# Patient Record
Sex: FEMALE | Race: WHITE | NOT HISPANIC OR LATINO
[De-identification: names, ages, dates, MRNs, and addresses within clinical notes are randomized per-mention and may not be internally consistent; named-entity substitution may affect disease eponyms.]

---

## 2017-05-17 ENCOUNTER — CLINICAL ADVICE (OUTPATIENT)
Age: 42
End: 2017-05-17

## 2017-05-17 DIAGNOSIS — N60.19 DIFFUSE CYSTIC MASTOPATHY OF UNSPECIFIED BREAST: ICD-10-CM

## 2017-06-27 ENCOUNTER — RX RENEWAL (OUTPATIENT)
Age: 42
End: 2017-06-27

## 2017-07-25 ENCOUNTER — APPOINTMENT (OUTPATIENT)
Dept: OBGYN | Facility: CLINIC | Age: 42
End: 2017-07-25

## 2017-07-25 VITALS
HEIGHT: 64 IN | BODY MASS INDEX: 24.75 KG/M2 | SYSTOLIC BLOOD PRESSURE: 120 MMHG | DIASTOLIC BLOOD PRESSURE: 82 MMHG | WEIGHT: 145 LBS

## 2017-07-25 RX ORDER — DESOGESTREL AND ETHINYL ESTRADIOL 7 DAYS X 3
0.1/0.125/0.15 KIT ORAL DAILY
Qty: 28 | Refills: 5 | Status: ACTIVE | COMMUNITY
Start: 2017-07-25 | End: 1900-01-01

## 2017-08-09 ENCOUNTER — APPOINTMENT (OUTPATIENT)
Dept: OBGYN | Facility: CLINIC | Age: 42
End: 2017-08-09
Payer: COMMERCIAL

## 2017-08-09 ENCOUNTER — ASOB RESULT (OUTPATIENT)
Age: 42
End: 2017-08-09

## 2017-08-09 PROCEDURE — 76830 TRANSVAGINAL US NON-OB: CPT

## 2017-08-18 RX ORDER — DESOGESTREL AND ETHINYL ESTRADIOL 7 DAYS X 3
0.1/0.125/0.15 KIT ORAL DAILY
Qty: 28 | Refills: 1 | Status: ACTIVE | COMMUNITY
Start: 2017-06-21 | End: 1900-01-01

## 2017-10-16 ENCOUNTER — RX RENEWAL (OUTPATIENT)
Age: 42
End: 2017-10-16

## 2018-01-13 ENCOUNTER — MOBILE ON CALL (OUTPATIENT)
Age: 43
End: 2018-01-13

## 2018-04-03 ENCOUNTER — RX RENEWAL (OUTPATIENT)
Age: 43
End: 2018-04-03

## 2018-04-03 RX ORDER — DESOGESTREL AND ETHINYL ESTRADIOL 7 DAYS X 3
0.1/0.125/0.15 KIT ORAL DAILY
Qty: 28 | Refills: 0 | Status: ACTIVE | COMMUNITY
Start: 2018-01-13 | End: 1900-01-01

## 2018-05-22 ENCOUNTER — RX RENEWAL (OUTPATIENT)
Age: 43
End: 2018-05-22

## 2018-09-26 ENCOUNTER — ASOB RESULT (OUTPATIENT)
Age: 43
End: 2018-09-26

## 2018-09-26 ENCOUNTER — APPOINTMENT (OUTPATIENT)
Dept: OBGYN | Facility: CLINIC | Age: 43
End: 2018-09-26
Payer: COMMERCIAL

## 2018-09-26 VITALS
WEIGHT: 148 LBS | HEIGHT: 64 IN | BODY MASS INDEX: 25.27 KG/M2 | SYSTOLIC BLOOD PRESSURE: 120 MMHG | DIASTOLIC BLOOD PRESSURE: 80 MMHG

## 2018-09-26 PROCEDURE — 99396 PREV VISIT EST AGE 40-64: CPT

## 2018-09-26 PROCEDURE — 76830 TRANSVAGINAL US NON-OB: CPT

## 2018-09-26 RX ORDER — DESOGESTREL AND ETHINYL ESTRADIOL 7 DAYS X 3
0.1/0.125/0.15 KIT ORAL
Qty: 28 | Refills: 5 | Status: ACTIVE | COMMUNITY
Start: 2018-09-26 | End: 1900-01-01

## 2018-09-27 LAB
CANDIDA VAG CYTO: NOT DETECTED
G VAGINALIS+PREV SP MTYP VAG QL MICRO: NOT DETECTED
HPV HIGH+LOW RISK DNA PNL CVX: NOT DETECTED
T VAGINALIS VAG QL WET PREP: NOT DETECTED

## 2018-09-28 ENCOUNTER — MESSAGE (OUTPATIENT)
Age: 43
End: 2018-09-28

## 2018-09-28 LAB
C TRACH RRNA SPEC QL NAA+PROBE: NOT DETECTED
N GONORRHOEA RRNA SPEC QL NAA+PROBE: NOT DETECTED
SOURCE AMPLIFICATION: NORMAL

## 2018-10-08 ENCOUNTER — CLINICAL ADVICE (OUTPATIENT)
Age: 43
End: 2018-10-08

## 2018-10-08 ENCOUNTER — MESSAGE (OUTPATIENT)
Age: 43
End: 2018-10-08

## 2018-10-08 LAB — CYTOLOGY CVX/VAG DOC THIN PREP: NORMAL

## 2018-10-24 ENCOUNTER — APPOINTMENT (OUTPATIENT)
Dept: OBGYN | Facility: CLINIC | Age: 43
End: 2018-10-24

## 2019-10-09 ENCOUNTER — RX RENEWAL (OUTPATIENT)
Age: 44
End: 2019-10-09

## 2019-10-09 RX ORDER — DESOGESTREL AND ETHINYL ESTRADIOL 7 DAYS X 3
0.1/0.125/0.15 KIT ORAL DAILY
Qty: 1 | Refills: 0 | Status: ACTIVE | COMMUNITY
Start: 2018-05-16 | End: 1900-01-01

## 2019-10-29 ENCOUNTER — APPOINTMENT (OUTPATIENT)
Dept: OBGYN | Facility: CLINIC | Age: 44
End: 2019-10-29
Payer: COMMERCIAL

## 2019-10-29 VITALS
HEIGHT: 64 IN | DIASTOLIC BLOOD PRESSURE: 78 MMHG | WEIGHT: 148 LBS | SYSTOLIC BLOOD PRESSURE: 116 MMHG | BODY MASS INDEX: 25.27 KG/M2

## 2019-10-29 PROCEDURE — 99396 PREV VISIT EST AGE 40-64: CPT

## 2019-10-29 RX ORDER — DESOGESTREL AND ETHINYL ESTRADIOL 7 DAYS X 3
0.1/0.125/0.15 KIT ORAL
Qty: 28 | Refills: 5 | Status: ACTIVE | COMMUNITY
Start: 2019-10-29 | End: 1900-01-01

## 2019-10-31 ENCOUNTER — MESSAGE (OUTPATIENT)
Age: 44
End: 2019-10-31

## 2019-10-31 LAB — HPV HIGH+LOW RISK DNA PNL CVX: NOT DETECTED

## 2019-11-05 ENCOUNTER — MESSAGE (OUTPATIENT)
Age: 44
End: 2019-11-05

## 2019-11-05 LAB — CYTOLOGY CVX/VAG DOC THIN PREP: ABNORMAL

## 2019-11-18 ENCOUNTER — ASOB RESULT (OUTPATIENT)
Age: 44
End: 2019-11-18

## 2019-11-18 ENCOUNTER — APPOINTMENT (OUTPATIENT)
Dept: OBGYN | Facility: CLINIC | Age: 44
End: 2019-11-18
Payer: COMMERCIAL

## 2019-11-18 PROCEDURE — 76830 TRANSVAGINAL US NON-OB: CPT

## 2019-11-19 ENCOUNTER — MESSAGE (OUTPATIENT)
Age: 44
End: 2019-11-19

## 2019-11-19 ENCOUNTER — CLINICAL ADVICE (OUTPATIENT)
Age: 44
End: 2019-11-19

## 2020-09-21 ENCOUNTER — APPOINTMENT (OUTPATIENT)
Dept: OBGYN | Facility: CLINIC | Age: 45
End: 2020-09-21
Payer: COMMERCIAL

## 2020-09-21 VITALS
BODY MASS INDEX: 24.75 KG/M2 | HEIGHT: 64 IN | SYSTOLIC BLOOD PRESSURE: 108 MMHG | DIASTOLIC BLOOD PRESSURE: 71 MMHG | WEIGHT: 145 LBS

## 2020-09-21 PROCEDURE — 99213 OFFICE O/P EST LOW 20 MIN: CPT

## 2020-09-21 RX ORDER — DESOGESTREL AND ETHINYL ESTRADIOL 7 DAYS X 3
0.1/0.125/0.15 KIT ORAL
Qty: 28 | Refills: 5 | Status: ACTIVE | COMMUNITY
Start: 2020-09-21 | End: 1900-01-01

## 2020-09-21 NOTE — HISTORY OF PRESENT ILLNESS
[FreeTextEntry1] : 44yo, G0 presents for evaluation and management of dysmenorrhea\par Her gyn histroy is significant for:\par 1.Dysmenorrhea\par 2.ASCUS\par LMP 9/15/2020

## 2020-09-21 NOTE — DISCUSSION/SUMMARY
[FreeTextEntry1] : 46yo with menses today.\par Dysmenorrhea well controlled on OCP, risks reviewed.\par Plan:\par 1.RTO PAP\par 2.OCP 6 months

## 2020-09-21 NOTE — PHYSICAL EXAM
[Examination Of The Breasts] : a normal appearance [No Masses] : no breast masses were palpable [Labia Majora] : normal [Labia Minora] : normal [Moderate] : There was moderate vaginal bleeding [Normal] : normal [Uterine Adnexae] : normal

## 2020-11-03 ENCOUNTER — APPOINTMENT (OUTPATIENT)
Dept: OBGYN | Facility: CLINIC | Age: 45
End: 2020-11-03
Payer: COMMERCIAL

## 2020-11-03 VITALS
WEIGHT: 145 LBS | DIASTOLIC BLOOD PRESSURE: 69 MMHG | HEIGHT: 64 IN | SYSTOLIC BLOOD PRESSURE: 127 MMHG | BODY MASS INDEX: 24.75 KG/M2

## 2020-11-03 PROCEDURE — 99072 ADDL SUPL MATRL&STAF TM PHE: CPT

## 2020-11-03 PROCEDURE — 99396 PREV VISIT EST AGE 40-64: CPT

## 2020-11-03 RX ORDER — FLUTICASONE PROPIONATE 0.05 MG/G
0.01 OINTMENT TOPICAL TWICE DAILY
Qty: 1 | Refills: 1 | Status: ACTIVE | COMMUNITY
Start: 2020-11-03 | End: 1900-01-01

## 2020-11-03 NOTE — HISTORY OF PRESENT ILLNESS
[FreeTextEntry1] : 46yo, G0 with gyn history significant for:\par 1.Dysmenorrhea\par 2.ASCUS\par LMP 10/26/2020

## 2020-11-03 NOTE — DISCUSSION/SUMMARY
[FreeTextEntry1] : 44yo with good cycle control on OCP, risks reviewed.\par She has been under a lot of stress lately, and "....can't relax..."\par She has used xanax in the past, risks reviewed.\par Additionally she has an allergic type deramtitis, rt axilla\par Plan:\par 1.PAP\par 2.Xanax 0.25#30\par 3.Steroind creme

## 2020-11-05 ENCOUNTER — MESSAGE (OUTPATIENT)
Age: 45
End: 2020-11-05

## 2020-11-05 LAB — HPV HIGH+LOW RISK DNA PNL CVX: NOT DETECTED

## 2020-11-08 LAB — CYTOLOGY CVX/VAG DOC THIN PREP: NORMAL

## 2020-12-18 ENCOUNTER — NON-APPOINTMENT (OUTPATIENT)
Age: 45
End: 2020-12-18

## 2020-12-22 ENCOUNTER — NON-APPOINTMENT (OUTPATIENT)
Age: 45
End: 2020-12-22

## 2020-12-23 ENCOUNTER — ASOB RESULT (OUTPATIENT)
Age: 45
End: 2020-12-23

## 2020-12-23 ENCOUNTER — APPOINTMENT (OUTPATIENT)
Dept: OBGYN | Facility: CLINIC | Age: 45
End: 2020-12-23
Payer: COMMERCIAL

## 2020-12-23 PROCEDURE — 99072 ADDL SUPL MATRL&STAF TM PHE: CPT

## 2020-12-23 PROCEDURE — 76830 TRANSVAGINAL US NON-OB: CPT

## 2020-12-24 ENCOUNTER — MESSAGE (OUTPATIENT)
Age: 45
End: 2020-12-24

## 2020-12-28 ENCOUNTER — NON-APPOINTMENT (OUTPATIENT)
Age: 45
End: 2020-12-28

## 2021-03-19 ENCOUNTER — NON-APPOINTMENT (OUTPATIENT)
Age: 46
End: 2021-03-19

## 2021-05-04 ENCOUNTER — APPOINTMENT (OUTPATIENT)
Dept: OBGYN | Facility: CLINIC | Age: 46
End: 2021-05-04
Payer: COMMERCIAL

## 2021-05-04 VITALS
WEIGHT: 145 LBS | HEIGHT: 64 IN | SYSTOLIC BLOOD PRESSURE: 144 MMHG | DIASTOLIC BLOOD PRESSURE: 78 MMHG | BODY MASS INDEX: 24.75 KG/M2

## 2021-05-04 DIAGNOSIS — N94.6 DYSMENORRHEA, UNSPECIFIED: ICD-10-CM

## 2021-05-04 PROCEDURE — 99214 OFFICE O/P EST MOD 30 MIN: CPT

## 2021-05-04 PROCEDURE — 99072 ADDL SUPL MATRL&STAF TM PHE: CPT

## 2021-05-04 RX ORDER — DESOGESTREL AND ETHINYL ESTRADIOL 7 DAYS X 3
0.1/0.125/0.15 KIT ORAL
Qty: 28 | Refills: 5 | Status: ACTIVE | COMMUNITY
Start: 2021-05-04 | End: 1900-01-01

## 2021-05-04 RX ORDER — ALPRAZOLAM 0.25 MG/1
0.25 TABLET ORAL
Qty: 30 | Refills: 0 | Status: ACTIVE | COMMUNITY
Start: 2021-05-04 | End: 1900-01-01

## 2021-05-04 NOTE — HISTORY OF PRESENT ILLNESS
[FreeTextEntry1] : 46yo, G0 presents for evaluation and management of dysmenorrhea which has been  well controlled with OCP.\par Her gyn history is also significant for:\par 1.ASCUS\par LMP 4/28/21

## 2021-05-04 NOTE — DISCUSSION/SUMMARY
[FreeTextEntry1] : 44yo with reaction to Pfizer vaccine post both doses:vomitting and headache-normal brain CT\par Good cycle control on OCP, risks reviewed\par Anxiety issues discussed-good relief with episodic Xanax, risks reviewed\par Plan:\par 1.OCP 6 months\par 2.Mammo 3/2021\par 3.RTO 6 months

## 2021-11-16 ENCOUNTER — APPOINTMENT (OUTPATIENT)
Dept: OBGYN | Facility: CLINIC | Age: 46
End: 2021-11-16
Payer: COMMERCIAL

## 2021-11-16 VITALS
SYSTOLIC BLOOD PRESSURE: 122 MMHG | HEIGHT: 64 IN | BODY MASS INDEX: 24.92 KG/M2 | WEIGHT: 146 LBS | DIASTOLIC BLOOD PRESSURE: 75 MMHG

## 2021-11-16 PROCEDURE — 99396 PREV VISIT EST AGE 40-64: CPT

## 2021-11-16 RX ORDER — DESOGESTREL AND ETHINYL ESTRADIOL 7 DAYS X 3
0.1/0.125/0.15 KIT ORAL
Qty: 28 | Refills: 5 | Status: ACTIVE | COMMUNITY
Start: 2021-11-16 | End: 1900-01-01

## 2021-11-16 RX ORDER — ALPRAZOLAM 0.25 MG/1
0.25 TABLET ORAL
Qty: 30 | Refills: 0 | Status: ACTIVE | COMMUNITY
Start: 2021-11-16 | End: 1900-01-01

## 2021-11-16 NOTE — HISTORY OF PRESENT ILLNESS
[FreeTextEntry1] : 47yo, G0 with gyn history significant fr:\par 1.Dysmenorrhea\par 2.ASCUS\par LMP 11/10/21

## 2021-11-16 NOTE — DISCUSSION/SUMMARY
[FreeTextEntry1] : 45yo with episodes of anxiety related to stressful job.She has been helped by alparazolam , risks reviewed.\par Risks OCP reviewed\par \par Plan:\par 1.PAP\par 2.Mammo\par 3.Continue OCP

## 2021-11-17 ENCOUNTER — MESSAGE (OUTPATIENT)
Age: 46
End: 2021-11-17

## 2021-11-17 LAB — HPV HIGH+LOW RISK DNA PNL CVX: NOT DETECTED

## 2021-11-21 ENCOUNTER — NON-APPOINTMENT (OUTPATIENT)
Age: 46
End: 2021-11-21

## 2021-11-22 ENCOUNTER — MESSAGE (OUTPATIENT)
Age: 46
End: 2021-11-22

## 2021-11-22 LAB — CYTOLOGY CVX/VAG DOC THIN PREP: NORMAL

## 2022-05-17 ENCOUNTER — APPOINTMENT (OUTPATIENT)
Dept: OBGYN | Facility: CLINIC | Age: 47
End: 2022-05-17
Payer: COMMERCIAL

## 2022-05-17 ENCOUNTER — TRANSCRIPTION ENCOUNTER (OUTPATIENT)
Age: 47
End: 2022-05-17

## 2022-05-17 VITALS
BODY MASS INDEX: 24.92 KG/M2 | HEART RATE: 86 BPM | DIASTOLIC BLOOD PRESSURE: 82 MMHG | WEIGHT: 146 LBS | SYSTOLIC BLOOD PRESSURE: 129 MMHG | HEIGHT: 64 IN

## 2022-05-17 DIAGNOSIS — F41.9 ANXIETY DISORDER, UNSPECIFIED: ICD-10-CM

## 2022-05-17 DIAGNOSIS — Z30.9 ENCOUNTER FOR CONTRACEPTIVE MANAGEMENT, UNSPECIFIED: ICD-10-CM

## 2022-05-17 DIAGNOSIS — Z12.39 ENCOUNTER FOR OTHER SCREENING FOR MALIGNANT NEOPLASM OF BREAST: ICD-10-CM

## 2022-05-17 PROCEDURE — 99213 OFFICE O/P EST LOW 20 MIN: CPT

## 2022-05-17 RX ORDER — ALPRAZOLAM 0.25 MG/1
0.25 TABLET ORAL
Qty: 30 | Refills: 0 | Status: ACTIVE | COMMUNITY
Start: 2020-11-03 | End: 1900-01-01

## 2022-05-18 NOTE — HISTORY OF PRESENT ILLNESS
[FreeTextEntry1] : 47yo female LMP 3 wk ago  presents for birth control counseling\par Patient reports taking Cylessa (Ethinyl Est./Desogesterol) for about 20 years. Recenty she has had severe HA since January. Went to Neuro and had MRA that was wnl. made major life changes but has not gone away. Associated with hormones. Stopped 2-3 weeks ago and feels that the headaches are getting better. \par Patient would like an alternative BC\par \par Patient also needs a more up to date breast mammo and sono prescription\par \par Patient also asks for 30 Xanax pills for 6 months which Dr. Vidal prescribed to help with racing thoughts once in a while.\par \par ROS: Denies fever/chills, Cough/sore throat, CP, SOB, N/V, Diarrhea/Constipation, Pelvic pain, Urinary frequency/ urgency/ Incontinence, irregular discharge or vaginal bleeding\par \par \par

## 2022-05-18 NOTE — PHYSICAL EXAM
[Chaperone Present] : A chaperone was present in the examining room during all aspects of the physical examination [Appropriately responsive] : appropriately responsive [Alert] : alert [No Acute Distress] : no acute distress [Soft] : soft [Non-tender] : non-tender [Oriented x3] : oriented x3

## 2022-05-18 NOTE — PLAN
[FreeTextEntry1] : BC management: Discussed all Progesterone options and surgical options. Patient prefers NOrethindrone\par Administration and use discussed\par \par Anxiety: encouraged patient to seek out a therapist. Patient declined mental health referral team. Explained to patient that the prescription can be sent out of courtesy but that I recommend lifestyle and habit changes \par \par GYN counseling: Patient instructed to call for Unusual Pelvic pain,  UTI symptoms, irregular vaginal bleeding/discharge- Patient verbalized agreement\par \par F/U: patient to follow up in 3 months to check on the Norethindrone \par \par

## 2022-05-23 ENCOUNTER — NON-APPOINTMENT (OUTPATIENT)
Age: 47
End: 2022-05-23

## 2022-05-23 RX ORDER — NORETHINDRONE 0.35 MG/1
0.35 TABLET ORAL
Qty: 3 | Refills: 0 | Status: ACTIVE | COMMUNITY
Start: 2022-05-20 | End: 1900-01-01

## 2022-07-12 ENCOUNTER — APPOINTMENT (OUTPATIENT)
Dept: OBGYN | Facility: CLINIC | Age: 47
End: 2022-07-12

## 2022-07-12 VITALS
BODY MASS INDEX: 24.92 KG/M2 | WEIGHT: 146 LBS | SYSTOLIC BLOOD PRESSURE: 105 MMHG | DIASTOLIC BLOOD PRESSURE: 68 MMHG | HEIGHT: 64 IN

## 2022-07-12 PROCEDURE — 99212 OFFICE O/P EST SF 10 MIN: CPT

## 2022-07-12 RX ORDER — NYSTATIN AND TRIAMCINOLONE ACETONIDE 100000; 1 MG/G; MG/G
100000-0.1 CREAM TOPICAL TWICE DAILY
Qty: 1 | Refills: 0 | Status: ACTIVE | COMMUNITY
Start: 2022-07-12 | End: 1900-01-01

## 2022-07-12 NOTE — DISCUSSION/SUMMARY
[FreeTextEntry1] : BV panel and urine culture ordered.\par rx sent for mycolog\par will await culture for possible treatment\par GYN counseling: Patient instructed to call for Unusual Pelvic pain, UTI symptoms, irregular vaginal bleeding/discharge- Patient verbalized agreement\par

## 2022-07-12 NOTE — PHYSICAL EXAM
[Chaperone Present] : A chaperone was present in the examining room during all aspects of the physical examination [FreeTextEntry1] : Ángela CASIANO [Normal] : uterus [No Bleeding] : there was no active vaginal bleeding [Uterine Adnexae] : were not tender and not enlarged

## 2022-07-12 NOTE — CHIEF COMPLAINT
[Urgent Visit] : Urgent Visit [FreeTextEntry1] : 47 y/o female, LMP 7/5/22 presents for an urgent visit c/o vaginal itch, external vaginal burning and irritation.  Also has mild urinary urgency.  Denies vaginal odor or discharge. \par \par ROS:  Denies fever/chills, HA, Cough/sore throat, CP, SOB, N/V, Diarrhea/Constipation, Pelvic pain, dysuria, irregular vaginal bleeding.\par

## 2022-07-14 DIAGNOSIS — B96.89 ACUTE VAGINITIS: ICD-10-CM

## 2022-07-14 DIAGNOSIS — N76.0 ACUTE VAGINITIS: ICD-10-CM

## 2022-07-14 LAB
CANDIDA VAG CYTO: DETECTED
G VAGINALIS+PREV SP MTYP VAG QL MICRO: DETECTED
T VAGINALIS VAG QL WET PREP: NOT DETECTED

## 2022-07-14 RX ORDER — METRONIDAZOLE 7.5 MG/G
0.75 GEL VAGINAL
Qty: 1 | Refills: 0 | Status: ACTIVE | COMMUNITY
Start: 2022-07-14 | End: 1900-01-01

## 2022-08-01 ENCOUNTER — NON-APPOINTMENT (OUTPATIENT)
Age: 47
End: 2022-08-01

## 2022-08-01 RX ORDER — FLUCONAZOLE 150 MG/1
150 TABLET ORAL
Qty: 2 | Refills: 0 | Status: ACTIVE | COMMUNITY
Start: 2022-07-14 | End: 1900-01-01

## 2022-08-05 ENCOUNTER — APPOINTMENT (OUTPATIENT)
Dept: OBGYN | Facility: CLINIC | Age: 47
End: 2022-08-05

## 2022-08-05 VITALS
HEIGHT: 64 IN | SYSTOLIC BLOOD PRESSURE: 117 MMHG | DIASTOLIC BLOOD PRESSURE: 72 MMHG | BODY MASS INDEX: 24.92 KG/M2 | WEIGHT: 146 LBS

## 2022-08-05 DIAGNOSIS — N89.8 OTHER SPECIFIED NONINFLAMMATORY DISORDERS OF VAGINA: ICD-10-CM

## 2022-08-05 PROCEDURE — 99213 OFFICE O/P EST LOW 20 MIN: CPT

## 2022-08-05 RX ORDER — NYSTATIN AND TRIAMCINOLONE ACETONIDE 100000; 1 MG/G; MG/G
100000-0.1 CREAM TOPICAL
Qty: 1 | Refills: 0 | Status: ACTIVE | COMMUNITY
Start: 2022-08-05 | End: 1900-01-01

## 2022-08-05 NOTE — HISTORY OF PRESENT ILLNESS
[FreeTextEntry1] : 45yo female LMP 8/3/22 presents for \par Patient reports July 12 came for severe vaginal irritation/pian/ burning. Saw Usha and was dx with yeast and BV\par Woke up on Monday with the same symptoms took Diflucan Monday and today but still sore itching and burning.\par Feels like all of this started after stopping her OCPs\par \par ROS: Denies fever/chills, HA, Cough/sore throat, CP, SOB, N/V, Diarrhea/Constipation, Pelvic pain, Urinary frequency/ urgency/ Incontinence\par \par \par \par \par

## 2022-08-05 NOTE — PLAN
[FreeTextEntry1] : vaginal irritation: affirm done, counseled on BOric Acid and Vaginal probiotics , Mycolog cream ordered for entroitis\par \par GYN counseling: Patient instructed to call for Unusual Pelvic pain,  UTI symptoms, irregular vaginal bleeding/discharge- Patient verbalized agreement\par \par F/U: patient to follow up as needed

## 2022-08-05 NOTE — PHYSICAL EXAM
[Chaperone Present] : A chaperone was present in the examining room during all aspects of the physical examination [Appropriately responsive] : appropriately responsive [Alert] : alert [No Acute Distress] : no acute distress [Soft] : soft [Non-tender] : non-tender [Oriented x3] : oriented x3 [Normal] : normal [FreeTextEntry4] : moderate blood noted as patient on period

## 2022-08-08 ENCOUNTER — RX RENEWAL (OUTPATIENT)
Age: 47
End: 2022-08-08

## 2022-08-08 ENCOUNTER — NON-APPOINTMENT (OUTPATIENT)
Age: 47
End: 2022-08-08

## 2022-08-08 LAB
CANDIDA VAG CYTO: NOT DETECTED
G VAGINALIS+PREV SP MTYP VAG QL MICRO: NOT DETECTED
T VAGINALIS VAG QL WET PREP: NOT DETECTED

## 2022-08-12 ENCOUNTER — APPOINTMENT (OUTPATIENT)
Dept: OBGYN | Facility: CLINIC | Age: 47
End: 2022-08-12

## 2022-09-08 ENCOUNTER — NON-APPOINTMENT (OUTPATIENT)
Age: 47
End: 2022-09-08

## 2022-10-26 ENCOUNTER — NON-APPOINTMENT (OUTPATIENT)
Age: 47
End: 2022-10-26

## 2023-03-16 ENCOUNTER — NON-APPOINTMENT (OUTPATIENT)
Age: 48
End: 2023-03-16

## 2023-03-21 ENCOUNTER — NON-APPOINTMENT (OUTPATIENT)
Age: 48
End: 2023-03-21

## 2023-08-14 ENCOUNTER — APPOINTMENT (OUTPATIENT)
Dept: OBGYN | Facility: CLINIC | Age: 48
End: 2023-08-14

## 2023-09-07 ENCOUNTER — APPOINTMENT (OUTPATIENT)
Dept: OBGYN | Facility: CLINIC | Age: 48
End: 2023-09-07
Payer: COMMERCIAL

## 2023-09-07 VITALS
HEIGHT: 64 IN | DIASTOLIC BLOOD PRESSURE: 81 MMHG | BODY MASS INDEX: 25.61 KG/M2 | WEIGHT: 150 LBS | SYSTOLIC BLOOD PRESSURE: 125 MMHG

## 2023-09-07 DIAGNOSIS — Z01.419 ENCOUNTER FOR GYNECOLOGICAL EXAMINATION (GENERAL) (ROUTINE) W/OUT ABNORMAL FINDINGS: ICD-10-CM

## 2023-09-07 DIAGNOSIS — R92.2 INCONCLUSIVE MAMMOGRAM: ICD-10-CM

## 2023-09-07 PROCEDURE — 99396 PREV VISIT EST AGE 40-64: CPT

## 2023-09-08 LAB — HPV HIGH+LOW RISK DNA PNL CVX: NOT DETECTED

## 2023-09-11 LAB — CYTOLOGY CVX/VAG DOC THIN PREP: NORMAL

## 2023-12-07 ENCOUNTER — APPOINTMENT (OUTPATIENT)
Dept: OBGYN | Facility: CLINIC | Age: 48
End: 2023-12-07
Payer: COMMERCIAL

## 2023-12-07 VITALS
HEIGHT: 64 IN | DIASTOLIC BLOOD PRESSURE: 73 MMHG | SYSTOLIC BLOOD PRESSURE: 112 MMHG | WEIGHT: 150 LBS | BODY MASS INDEX: 25.61 KG/M2

## 2023-12-07 DIAGNOSIS — N95.1 MENOPAUSAL AND FEMALE CLIMACTERIC STATES: ICD-10-CM

## 2023-12-07 PROCEDURE — 99212 OFFICE O/P EST SF 10 MIN: CPT

## 2024-07-11 ENCOUNTER — APPOINTMENT (OUTPATIENT)
Dept: OBGYN | Facility: CLINIC | Age: 49
End: 2024-07-11
Payer: COMMERCIAL

## 2024-07-11 VITALS
HEIGHT: 64 IN | BODY MASS INDEX: 25.27 KG/M2 | DIASTOLIC BLOOD PRESSURE: 77 MMHG | WEIGHT: 148 LBS | SYSTOLIC BLOOD PRESSURE: 115 MMHG

## 2024-07-11 DIAGNOSIS — N95.1 MENOPAUSAL AND FEMALE CLIMACTERIC STATES: ICD-10-CM

## 2024-07-11 PROCEDURE — 99213 OFFICE O/P EST LOW 20 MIN: CPT

## 2024-07-11 RX ORDER — PROGESTERONE 100 MG/1
100 CAPSULE ORAL
Qty: 90 | Refills: 3 | Status: ACTIVE | COMMUNITY
Start: 2024-07-11 | End: 1900-01-01

## 2024-07-11 RX ORDER — ESTRADIOL 0.05 MG/D
0.05 PATCH TRANSDERMAL
Qty: 12 | Refills: 3 | Status: ACTIVE | COMMUNITY
Start: 2024-07-11 | End: 1900-01-01

## 2024-09-09 ENCOUNTER — APPOINTMENT (OUTPATIENT)
Dept: OBGYN | Facility: CLINIC | Age: 49
End: 2024-09-09
Payer: COMMERCIAL

## 2024-09-09 VITALS
SYSTOLIC BLOOD PRESSURE: 121 MMHG | BODY MASS INDEX: 25.61 KG/M2 | DIASTOLIC BLOOD PRESSURE: 74 MMHG | HEIGHT: 64 IN | WEIGHT: 150 LBS

## 2024-09-09 DIAGNOSIS — Z01.419 ENCOUNTER FOR GYNECOLOGICAL EXAMINATION (GENERAL) (ROUTINE) W/OUT ABNORMAL FINDINGS: ICD-10-CM

## 2024-09-09 PROCEDURE — 99396 PREV VISIT EST AGE 40-64: CPT

## 2024-09-13 LAB
CYTOLOGY CVX/VAG DOC THIN PREP: NORMAL
HPV HIGH+LOW RISK DNA PNL CVX: NOT DETECTED

## 2025-01-10 ENCOUNTER — APPOINTMENT (OUTPATIENT)
Dept: OBGYN | Facility: CLINIC | Age: 50
End: 2025-01-10
Payer: COMMERCIAL

## 2025-01-10 VITALS — BODY MASS INDEX: 25.61 KG/M2 | WEIGHT: 150 LBS | HEIGHT: 64 IN

## 2025-01-10 DIAGNOSIS — N64.4 MASTODYNIA: ICD-10-CM

## 2025-01-10 PROCEDURE — 99214 OFFICE O/P EST MOD 30 MIN: CPT

## 2025-01-14 DIAGNOSIS — N63.11 UNSPECIFIED LUMP IN THE RIGHT BREAST, UPPER OUTER QUADRANT: ICD-10-CM

## 2025-01-14 DIAGNOSIS — R92.0 MAMMOGRAPHIC MICROCALCIFICATION FOUND ON DIAGNOSTIC IMAGING OF BREAST: ICD-10-CM

## 2025-01-27 ENCOUNTER — NON-APPOINTMENT (OUTPATIENT)
Age: 50
End: 2025-01-27

## 2025-01-31 ENCOUNTER — NON-APPOINTMENT (OUTPATIENT)
Age: 50
End: 2025-01-31

## 2025-02-03 ENCOUNTER — NON-APPOINTMENT (OUTPATIENT)
Age: 50
End: 2025-02-03

## 2025-02-05 ENCOUNTER — NON-APPOINTMENT (OUTPATIENT)
Age: 50
End: 2025-02-05

## 2025-09-10 ENCOUNTER — NON-APPOINTMENT (OUTPATIENT)
Age: 50
End: 2025-09-10

## 2025-09-17 ENCOUNTER — NON-APPOINTMENT (OUTPATIENT)
Age: 50
End: 2025-09-17

## 2025-09-18 ENCOUNTER — APPOINTMENT (OUTPATIENT)
Dept: OBGYN | Facility: CLINIC | Age: 50
End: 2025-09-18
Payer: COMMERCIAL

## 2025-09-18 VITALS
SYSTOLIC BLOOD PRESSURE: 134 MMHG | DIASTOLIC BLOOD PRESSURE: 77 MMHG | WEIGHT: 148 LBS | HEIGHT: 64 IN | BODY MASS INDEX: 25.27 KG/M2

## 2025-09-18 DIAGNOSIS — Z01.419 ENCOUNTER FOR GYNECOLOGICAL EXAMINATION (GENERAL) (ROUTINE) W/OUT ABNORMAL FINDINGS: ICD-10-CM

## 2025-09-18 PROCEDURE — 99396 PREV VISIT EST AGE 40-64: CPT

## 2025-09-26 LAB — CYTOLOGY CVX/VAG DOC THIN PREP: NORMAL
